# Patient Record
Sex: FEMALE | URBAN - METROPOLITAN AREA
[De-identification: names, ages, dates, MRNs, and addresses within clinical notes are randomized per-mention and may not be internally consistent; named-entity substitution may affect disease eponyms.]

---

## 2022-04-01 ENCOUNTER — LAB OUTSIDE AN ENCOUNTER (OUTPATIENT)
Dept: URBAN - METROPOLITAN AREA CLINIC 113 | Facility: CLINIC | Age: 52
End: 2022-04-01

## 2022-04-01 ENCOUNTER — OFFICE VISIT (OUTPATIENT)
Dept: URBAN - METROPOLITAN AREA CLINIC 113 | Facility: CLINIC | Age: 52
End: 2022-04-01
Payer: COMMERCIAL

## 2022-04-01 VITALS
HEIGHT: 67 IN | BODY MASS INDEX: 40.18 KG/M2 | SYSTOLIC BLOOD PRESSURE: 163 MMHG | DIASTOLIC BLOOD PRESSURE: 90 MMHG | TEMPERATURE: 97.1 F | HEART RATE: 76 BPM | RESPIRATION RATE: 20 BRPM | WEIGHT: 256 LBS

## 2022-04-01 DIAGNOSIS — R74.8 ELEVATED LIVER ENZYMES: ICD-10-CM

## 2022-04-01 DIAGNOSIS — R10.12 LUQ ABDOMINAL PAIN: ICD-10-CM

## 2022-04-01 DIAGNOSIS — Z12.11 ENCOUNTER FOR SCREENING FOR MALIGNANT NEOPLASM OF COLON: ICD-10-CM

## 2022-04-01 DIAGNOSIS — Z12.12 ENCOUNTER FOR SCREENING FOR MALIGNANT NEOPLASM OF RECTUM: ICD-10-CM

## 2022-04-01 PROCEDURE — 99204 OFFICE O/P NEW MOD 45 MIN: CPT | Performed by: INTERNAL MEDICINE

## 2022-04-01 RX ORDER — ATORVASTATIN CALCIUM 10 MG/1
1 TABLET TABLET, FILM COATED ORAL ONCE A DAY
Status: ACTIVE | COMMUNITY

## 2022-04-01 RX ORDER — METFORMIN HYDROCHLORIDE 1000 MG/1
1 TABLET WITH A MEAL TABLET, FILM COATED ORAL ONCE A DAY
Status: ACTIVE | COMMUNITY

## 2022-04-01 RX ORDER — GLIPIZIDE 5 MG/1
1 TABLET 30 MINUTES BEFORE BREAKFAST TABLET ORAL ONCE A DAY
Status: ACTIVE | COMMUNITY

## 2022-04-01 RX ORDER — OMEPRAZOLE 40 MG/1
1 CAPSULE 30 MINUTES BEFORE MORNING MEAL CAPSULE, DELAYED RELEASE ORAL ONCE A DAY
Status: ACTIVE | COMMUNITY

## 2022-04-01 NOTE — HPI-TODAY'S VISIT:
Emmy Keating is a 51-year-old female from [place] who is referred for evaluation of right upper quadrant abdominal pain, elevated liver enzymes and nausea.  She was seen initially for these complaints by Dr. Quiles, her PCP, in late fall 2021 and underwent an abdominal sonogram on October 25, 2021 which was normal.  HIDA scan done October 26, 2021 showed 11% ejection fraction and she was referred to Dr. Loyd Irwin for consideration of cholecystectomy.  At the time of her12/2021  cholecystectomy, she was told by Dr. Irwin that she had "cirrhosis of the liver."  Despite this observation, no liver biopsy was performed.    Interestingly, she was taking Ozempic, and noted that that medication had a side effect profile including nausea.  After she stopped taking Ozempic, her nausea substantially improved.  The patient has had left upper quadrant abdominal pain occurring for years intermittently.  It lasts between an hour to all day, as she described this as a sharp pain, change in body position, and not associated with melena, bright red rectal bleeding, fevers, chills, sweats, etc.  She denies any dark urine or jaundice.  The patient had labs done her PCP February 14, 2022 showing an AST of 64, and alkaline phosphatase of 91, and albumin of 4.3, total bilirubin 0.5, ALT of 40, and hepatitis serologies which were negative.  A CBC was notable for a white blood cell count of 3300, hemoglobin 13.2, and a platelet count of 100,000.  Lipase was 39.  She presents today for further evaluation of liver enzyme elevation and abdominal pain.

## 2022-04-02 LAB
ACTIN (SMOOTH MUSCLE) ANTIBODY: 5
ANA DIRECT: NEGATIVE
FERRITIN, SERUM: 56
IRON BIND.CAP.(TIBC): 329
IRON SATURATION: 17
IRON: 57
MITOCHONDRIAL (M2) ANTIBODY: <20
UIBC: 272

## 2022-05-13 ENCOUNTER — TELEPHONE ENCOUNTER (OUTPATIENT)
Dept: URBAN - METROPOLITAN AREA CLINIC 113 | Facility: CLINIC | Age: 52
End: 2022-05-13

## 2022-05-17 ENCOUNTER — OFFICE VISIT (OUTPATIENT)
Dept: URBAN - METROPOLITAN AREA SURGERY CENTER 25 | Facility: SURGERY CENTER | Age: 52
End: 2022-05-17

## 2022-06-07 ENCOUNTER — OFFICE VISIT (OUTPATIENT)
Dept: URBAN - METROPOLITAN AREA SURGERY CENTER 25 | Facility: SURGERY CENTER | Age: 52
End: 2022-06-07

## 2022-06-20 ENCOUNTER — OFFICE VISIT (OUTPATIENT)
Dept: URBAN - METROPOLITAN AREA MEDICAL CENTER 2 | Facility: MEDICAL CENTER | Age: 52
End: 2022-06-20
Payer: COMMERCIAL

## 2022-06-20 DIAGNOSIS — Z12.11 COLON CANCER SCREENING: ICD-10-CM

## 2022-06-20 DIAGNOSIS — K64.0 BLEEDING GRADE I HEMORRHOIDS: ICD-10-CM

## 2022-06-20 DIAGNOSIS — R10.12 ABDOMINAL BURNING SENSATION IN LEFT UPPER QUADRANT: ICD-10-CM

## 2022-06-20 DIAGNOSIS — D12.4 ADENOMA OF DESCENDING COLON: ICD-10-CM

## 2022-06-20 DIAGNOSIS — I85.00 ESOPHAGEAL  VARICOSE VEINS: ICD-10-CM

## 2022-06-20 DIAGNOSIS — K29.60 ADENOPAPILLOMATOSIS GASTRICA: ICD-10-CM

## 2022-06-20 DIAGNOSIS — K57.30 ACQUIRED DIVERTICULOSIS OF COLON: ICD-10-CM

## 2022-06-20 PROCEDURE — 45385 COLONOSCOPY W/LESION REMOVAL: CPT | Performed by: INTERNAL MEDICINE

## 2022-06-20 PROCEDURE — 43239 EGD BIOPSY SINGLE/MULTIPLE: CPT | Performed by: INTERNAL MEDICINE

## 2022-07-01 ENCOUNTER — DASHBOARD ENCOUNTERS (OUTPATIENT)
Age: 52
End: 2022-07-01

## 2022-07-01 ENCOUNTER — OFFICE VISIT (OUTPATIENT)
Dept: URBAN - METROPOLITAN AREA CLINIC 113 | Facility: CLINIC | Age: 52
End: 2022-07-01
Payer: COMMERCIAL

## 2022-07-01 ENCOUNTER — WEB ENCOUNTER (OUTPATIENT)
Dept: URBAN - METROPOLITAN AREA CLINIC 113 | Facility: CLINIC | Age: 52
End: 2022-07-01

## 2022-07-01 VITALS
HEART RATE: 70 BPM | RESPIRATION RATE: 20 BRPM | TEMPERATURE: 97.5 F | HEIGHT: 67 IN | BODY MASS INDEX: 39.55 KG/M2 | WEIGHT: 252 LBS | DIASTOLIC BLOOD PRESSURE: 87 MMHG | SYSTOLIC BLOOD PRESSURE: 146 MMHG

## 2022-07-01 DIAGNOSIS — I85.10 SECONDARY ESOPHAGEAL VARICES WITHOUT BLEEDING: ICD-10-CM

## 2022-07-01 DIAGNOSIS — R10.13 EPIGASTRIC PAIN: ICD-10-CM

## 2022-07-01 DIAGNOSIS — K76.0 HEPATIC STEATOSIS: ICD-10-CM

## 2022-07-01 DIAGNOSIS — R74.8 ELEVATED LIVER ENZYMES: ICD-10-CM

## 2022-07-01 PROCEDURE — 99214 OFFICE O/P EST MOD 30 MIN: CPT | Performed by: NURSE PRACTITIONER

## 2022-07-01 RX ORDER — ATORVASTATIN CALCIUM 10 MG/1
1 TABLET TABLET, FILM COATED ORAL ONCE A DAY
Status: ACTIVE | COMMUNITY

## 2022-07-01 RX ORDER — METFORMIN HYDROCHLORIDE 1000 MG/1
1 TABLET WITH A MEAL TABLET, FILM COATED ORAL ONCE A DAY
Status: ACTIVE | COMMUNITY

## 2022-07-01 RX ORDER — GLIPIZIDE 5 MG/1
1 TABLET 30 MINUTES BEFORE BREAKFAST TABLET ORAL ONCE A DAY
Status: ACTIVE | COMMUNITY

## 2022-07-01 RX ORDER — OMEPRAZOLE 40 MG/1
1 CAPSULE 30 MINUTES BEFORE MORNING MEAL CAPSULE, DELAYED RELEASE ORAL ONCE A DAY
Status: ACTIVE | COMMUNITY

## 2022-07-01 NOTE — HPI-OTHER HISTORIES
EGD 6/20/2022:Grade 1 esophageal varices in the lower third of the esophagus, chronic gastritis status post biopsy, normal examined duodenum.  Antral biopsies demonstrated foveolar mucosa with moderate chronic inactive gastritis and no H. pylori. Colonoscopy 6/20/2022:BB PS 9, sigmoid and descending diverticulosis, normal terminal ileum, nonbleeding internal hemorrhoids, removal of a 6 mm descending sessile tubular adenoma.  Surveillance recommended in 2027. Abdominal ultrasound 4/15/2022:Hepatomegaly with increased echogenicity of the liver which can be seen with hepatic steatosis.  Portal vein is patent with normal flow direction.  Liver fibrosis staging in the cirrhotic range; however, there is question of adequate technical quality given the variance of sampling. Labs 4/1/2022:Iron 57, TIBC 329, iron saturation 17%.  Ferritin 56.  AMA, PAULINA, ASMA negative.

## 2022-07-01 NOTE — HPI-TODAY'S VISIT:
This is a 52-year-old female with a history of diabetes, hypertension, anxiety/depression, right upper quadrant abdominal pain, elevated liver enzymes, and nausea presenting for follow-up. She was last seen 4/1/2022.  She had previously been seen for right upper quadrant pain, elevated liver enzymes, and nausea.  Abdominal ultrasound was normal.  HIDA scan demonstrated biliary dyskinesia and she had undergone cholecystectomy in 2021.  She was informed by her surgeon that she has cirrhosis of the liver.  Despite this observation, no biopsy was obtained.  She had been taking Ozempic and and noted that it had a side effect of nausea.  After she discontinued it her nausea substantially improved.  She reported a several year history of intermittent left upper quadrant abdominal pain.  Labs in February 2022 demonstrated mild liver enzyme elevation.  It was discussed that liver enzyme elevation was likely due to alcoholic fatty liver disease.  Further evaluation to assess for autoimmune sources of liver disease and to screen for hemochromatosis recommended.  She was scheduled for an ultrasound with elastography, EGD, and screening colonoscopy.  She reports heartburn and regurgitation are controlled on omeprazole 40 mg daily.  She continues to experience intermittent epigastric pain usually occurring postprandial.  It is somewhat improved with recent changes in her diet directed toward weight loss.  She denies nausea or any other abdominal symptoms.

## 2022-07-02 LAB
A/G RATIO: 1.8
ALBUMIN: 4.5
ALKALINE PHOSPHATASE: 105
ALT (SGPT): 22
AST (SGOT): 38
BASO (ABSOLUTE): 0
BASOS: 1
BILIRUBIN, TOTAL: 0.6
BUN/CREATININE RATIO: 14
BUN: 10
CALCIUM: 9.2
CARBON DIOXIDE, TOTAL: 21
CHLORIDE: 101
CREATININE: 0.69
EGFR: 104
EOS (ABSOLUTE): 0
EOS: 1
GLOBULIN, TOTAL: 2.5
GLUCOSE: 133
HEMATOCRIT: 39.8
HEMATOLOGY COMMENTS:: (no result)
HEMOGLOBIN: 14
IMMATURE CELLS: (no result)
IMMATURE GRANS (ABS): 0
IMMATURE GRANULOCYTES: 1
INR: 1
LYMPHS (ABSOLUTE): 1.2
LYMPHS: 29
MCH: 28.7
MCHC: 35.2
MCV: 82
MONOCYTES(ABSOLUTE): 0.3
MONOCYTES: 8
NEUTROPHILS (ABSOLUTE): 2.5
NEUTROPHILS: 60
NRBC: (no result)
PLATELETS: 125
POTASSIUM: 4.2
PROTEIN, TOTAL: 7
PROTHROMBIN TIME: 10.3
RBC: 4.87
RDW: 13.2
SODIUM: 138
WBC: 4.1

## 2022-07-03 PROBLEM — 79922009: Status: ACTIVE | Noted: 2022-07-01

## 2022-07-03 PROBLEM — 707724006 ELEVATED LIVER ENZYMES LEVEL: Status: ACTIVE | Noted: 2022-07-01

## 2022-07-03 PROBLEM — 14223005: Status: ACTIVE | Noted: 2022-07-03

## 2022-07-03 PROBLEM — 197321007: Status: ACTIVE | Noted: 2022-07-03

## 2022-10-03 ENCOUNTER — OFFICE VISIT (OUTPATIENT)
Dept: URBAN - METROPOLITAN AREA CLINIC 113 | Facility: CLINIC | Age: 52
End: 2022-10-03

## 2022-10-03 RX ORDER — METFORMIN HYDROCHLORIDE 1000 MG/1
1 TABLET WITH A MEAL TABLET, FILM COATED ORAL ONCE A DAY
Status: ACTIVE | COMMUNITY

## 2022-10-03 RX ORDER — OMEPRAZOLE 40 MG/1
1 CAPSULE 30 MINUTES BEFORE MORNING MEAL CAPSULE, DELAYED RELEASE ORAL ONCE A DAY
Status: ACTIVE | COMMUNITY

## 2022-10-03 RX ORDER — GLIPIZIDE 5 MG/1
1 TABLET 30 MINUTES BEFORE BREAKFAST TABLET ORAL ONCE A DAY
Status: ACTIVE | COMMUNITY

## 2022-10-03 RX ORDER — ATORVASTATIN CALCIUM 10 MG/1
1 TABLET TABLET, FILM COATED ORAL ONCE A DAY
Status: ACTIVE | COMMUNITY

## 2022-10-03 NOTE — HPI-TODAY'S VISIT:
This is a 52-year-old female with a history of diabetes, hypertension, anxiety/depression, right upper quadrant pain/epigastric pain, liver enzyme elevation, and nausea presenting for follow-up. She was last seen 7/1/2022.  GERD was well controlled on omeprazole 40 mg daily.  She was complaining of postprandial epigastric pain that has been persistent following cholecystectomy.  EGD was negative for source.  She was scheduled for a CT scan.  Functional abdominal pain was within the differential.  Labs to assess liver enzyme elevation were negative for autoimmune, hereditary, or viral sources of liver disease.  Her surgeon reported a finding of cirrhosis during cholecystectomy.  A biopsy was not obtained.  Recent ultrasound showed hepatomegaly with findings consistent with hepatic steatosis.  Liver fibrosis staging was determined to be in the cirrhotic range but there was a question of adequate technical quality given the variance of sampling.   her recent EGD demonstrated grade 1 esophageal varices and labs noted chronic thrombocytopenia which further supported a diagnosis of cirrhosis.  This was to be corroborated with Dr. Rios.  It was recommended that she continue efforts at weight reduction and cholesterol and diabetes control.  Labs were ordered. CT of the abdomen and pelvis with contrast 7/19/2022:No acute process.  Small hiatal hernia.  Borderline splenomegaly.  Right lower lobe pleural-based pulmonary nodule measuring 6 mm.  Liver normal.  Gallbladder normal. Labs 7/1/2022:BMP normal with exception of glucose 133.  LFTs: TB 0.6, , ALT 22, AST 38.  CBC: WBC 4.1, hemoglobin 14, MCV 82, platelet 125.  PT 10.3, INR 1.0.